# Patient Record
Sex: MALE
[De-identification: names, ages, dates, MRNs, and addresses within clinical notes are randomized per-mention and may not be internally consistent; named-entity substitution may affect disease eponyms.]

---

## 2019-08-20 ENCOUNTER — TRANSCRIPTION ENCOUNTER (OUTPATIENT)
Age: 44
End: 2019-08-20

## 2020-04-13 ENCOUNTER — TRANSCRIPTION ENCOUNTER (OUTPATIENT)
Age: 45
End: 2020-04-13

## 2023-08-15 ENCOUNTER — APPOINTMENT (OUTPATIENT)
Dept: UROLOGY | Facility: CLINIC | Age: 48
End: 2023-08-15
Payer: COMMERCIAL

## 2023-08-15 VITALS
DIASTOLIC BLOOD PRESSURE: 86 MMHG | OXYGEN SATURATION: 94 % | BODY MASS INDEX: 31.1 KG/M2 | WEIGHT: 210 LBS | SYSTOLIC BLOOD PRESSURE: 146 MMHG | HEART RATE: 66 BPM | RESPIRATION RATE: 16 BRPM | HEIGHT: 69 IN | TEMPERATURE: 98.3 F

## 2023-08-15 DIAGNOSIS — Z78.9 OTHER SPECIFIED HEALTH STATUS: ICD-10-CM

## 2023-08-15 DIAGNOSIS — Z87.891 PERSONAL HISTORY OF NICOTINE DEPENDENCE: ICD-10-CM

## 2023-08-15 DIAGNOSIS — N52.9 MALE ERECTILE DYSFUNCTION, UNSPECIFIED: ICD-10-CM

## 2023-08-15 PROBLEM — Z00.00 ENCOUNTER FOR PREVENTIVE HEALTH EXAMINATION: Status: ACTIVE | Noted: 2023-08-15

## 2023-08-15 PROCEDURE — 99203 OFFICE O/P NEW LOW 30 MIN: CPT

## 2023-08-15 RX ORDER — TADALAFIL 20 MG/1
20 TABLET ORAL
Qty: 20 | Refills: 7 | Status: ACTIVE | COMMUNITY
Start: 2023-08-15 | End: 1900-01-01

## 2023-08-15 NOTE — HISTORY OF PRESENT ILLNESS
[FreeTextEntry1] : Language: English Date of First visit: 08/15/2023 Accompanied by: self Contact info: Referring Provider/PCP: Dr. Mcfadden Fax:   CC/ Problem List: ED =============================================================================== FIRST VISIT / Summary: Very pleasant 48 year old M here for ED. States saw PCP in [facility] blood tests and PSA normal   Usually 6/7 out of 10 without meds, 10/10 with.  ------------------------------------------------------------------------------------------- INTERVAL VISITS:   ===============================================================================   PMH: ED Meds: none All: Green Tea (anaphylaxis), bee stings FHx: No  malignancies  SocHx:   PSH:     ROS: Review of Systems is as per HPI unless otherwise denoted below     =============================================================================== DATA:   LABS (SELECTED):---------------------------------------------------------------------------------------------------     RADS:-------------------------------------------------------------------------------------------------------------------     PATHOLOGY/CYTOLOGY:-------------------------------------------------------------------------------------------     VOIDING STUDIES: ----------------------------------------------------------------------------------------------------     STONE STUDIES: (Analysis/LLSA)----------------------------------------------------------------------------------     PROCEDURES: -----------------------------------------------------------------------------------------------         ===============================================================================   PHYSICAL EXAM:     FOCUSED: ----------------------------------------------------------------------------------------------------------------     ======================================================================================= DISCUSSION: PDE5 inhibitor information: The risks of this medication include facial redness and/or flushing, reflux (indigestion), headache, back pain, an erection that won't go away, chest pain or heart attack, dizziness, drop in blood pressure, loss of vision, blue vision, blurry vision and loss of hearing. The patient understands that he cannot take together with nitrates and that should he develop chest pain, he must alert emergency personnel if he has taken within the last 24 hours.    I recommend that you take the first dose by yourself so you can get acquainted with how this medication makes you feel and whether or not you will have any of the above side effects. The medication may work as soon as 20 minutes on an empty stomach but it can take up to 60 minutes (or longer) on a full stomach. The medication does requires sexual stimulation to work. If you take it without sexual stimulation, you will not get an erection. Please come to the ER for an erection that won't go away, chest pain, or loss of vision or hearing. The patient understood all of these instructions.  The patient has been screened for potential medication interactions. He is not on any po antifungals or HIV meds (protease inhibitors, NNRTI's).   ======================================================================================= ASSESSMENT and PLAN   1. ED - sent tadalafil - no side effects - RTC in 1 year  2. PCP - given contacts for new PCP     =======================================================================================   Thank you for allowing me to assist in the care of your patient. Should you have any questions please do not hesitate to reach out to me.     Jose Irizarry MD                                                           [facility] Physician [facility]   [place] Office: [address], [address]  [place] [address]   T: [phone]   F: [phone]     [place] Office: [address], [address]st [place] [address] T: [phone] F: [phone]

## 2023-12-05 ENCOUNTER — APPOINTMENT (OUTPATIENT)
Dept: FAMILY MEDICINE | Facility: CLINIC | Age: 48
End: 2023-12-05

## 2024-08-20 ENCOUNTER — APPOINTMENT (OUTPATIENT)
Dept: UROLOGY | Facility: CLINIC | Age: 49
End: 2024-08-20